# Patient Record
Sex: MALE | Race: OTHER | HISPANIC OR LATINO | URBAN - METROPOLITAN AREA
[De-identification: names, ages, dates, MRNs, and addresses within clinical notes are randomized per-mention and may not be internally consistent; named-entity substitution may affect disease eponyms.]

---

## 2017-02-18 ENCOUNTER — EMERGENCY (EMERGENCY)
Facility: HOSPITAL | Age: 22
LOS: 1 days | Discharge: HOME | End: 2017-02-18
Attending: EMERGENCY MEDICINE
Payer: COMMERCIAL

## 2017-02-18 VITALS — HEIGHT: 69 IN | WEIGHT: 149.91 LBS

## 2017-02-18 VITALS
RESPIRATION RATE: 20 BRPM | TEMPERATURE: 98 F | DIASTOLIC BLOOD PRESSURE: 77 MMHG | OXYGEN SATURATION: 97 % | SYSTOLIC BLOOD PRESSURE: 135 MMHG

## 2017-02-18 DIAGNOSIS — R10.9 UNSPECIFIED ABDOMINAL PAIN: ICD-10-CM

## 2017-02-18 DIAGNOSIS — R30.0 DYSURIA: ICD-10-CM

## 2017-02-18 DIAGNOSIS — K64.9 UNSPECIFIED HEMORRHOIDS: ICD-10-CM

## 2017-02-18 LAB
ANION GAP SERPL CALC-SCNC: 15 MMOL/L — SIGNIFICANT CHANGE UP (ref 5–17)
APPEARANCE UR: CLEAR — SIGNIFICANT CHANGE UP
BILIRUB UR-MCNC: NEGATIVE — SIGNIFICANT CHANGE UP
BUN SERPL-MCNC: 12 MG/DL — SIGNIFICANT CHANGE UP (ref 8–20)
CALCIUM SERPL-MCNC: 9.5 MG/DL — SIGNIFICANT CHANGE UP (ref 8.6–10.2)
CHLORIDE SERPL-SCNC: 101 MMOL/L — SIGNIFICANT CHANGE UP (ref 98–107)
CO2 SERPL-SCNC: 24 MMOL/L — SIGNIFICANT CHANGE UP (ref 22–29)
COLOR SPEC: YELLOW — SIGNIFICANT CHANGE UP
CREAT SERPL-MCNC: 0.84 MG/DL — SIGNIFICANT CHANGE UP (ref 0.5–1.3)
DIFF PNL FLD: NEGATIVE — SIGNIFICANT CHANGE UP
GLUCOSE SERPL-MCNC: 97 MG/DL — SIGNIFICANT CHANGE UP (ref 70–115)
GLUCOSE UR QL: NEGATIVE MG/DL — SIGNIFICANT CHANGE UP
HCT VFR BLD CALC: 44.5 % — SIGNIFICANT CHANGE UP (ref 42–52)
HGB BLD-MCNC: 15.9 G/DL — SIGNIFICANT CHANGE UP (ref 14–18)
KETONES UR-MCNC: NEGATIVE — SIGNIFICANT CHANGE UP
LEUKOCYTE ESTERASE UR-ACNC: NEGATIVE — SIGNIFICANT CHANGE UP
MCHC RBC-ENTMCNC: 29.2 PG — SIGNIFICANT CHANGE UP (ref 27–31)
MCHC RBC-ENTMCNC: 35.7 G/DL — SIGNIFICANT CHANGE UP (ref 32–36)
MCV RBC AUTO: 81.8 FL — SIGNIFICANT CHANGE UP (ref 80–94)
NITRITE UR-MCNC: NEGATIVE — SIGNIFICANT CHANGE UP
PH UR: 6 — SIGNIFICANT CHANGE UP (ref 4.8–8)
PLATELET # BLD AUTO: 224 K/UL — SIGNIFICANT CHANGE UP (ref 150–400)
POTASSIUM SERPL-MCNC: 4 MMOL/L — SIGNIFICANT CHANGE UP (ref 3.5–5.3)
POTASSIUM SERPL-SCNC: 4 MMOL/L — SIGNIFICANT CHANGE UP (ref 3.5–5.3)
PROT UR-MCNC: NEGATIVE MG/DL — SIGNIFICANT CHANGE UP
RBC # BLD: 5.44 M/UL — SIGNIFICANT CHANGE UP (ref 4.6–6.2)
RBC # FLD: 12.9 % — SIGNIFICANT CHANGE UP (ref 11–15.6)
SODIUM SERPL-SCNC: 140 MMOL/L — SIGNIFICANT CHANGE UP (ref 135–145)
SP GR SPEC: 1.01 — SIGNIFICANT CHANGE UP (ref 1.01–1.02)
UROBILINOGEN FLD QL: NEGATIVE MG/DL — SIGNIFICANT CHANGE UP
WBC # BLD: 9.4 K/UL — SIGNIFICANT CHANGE UP (ref 4.8–10.8)
WBC # FLD AUTO: 9.4 K/UL — SIGNIFICANT CHANGE UP (ref 4.8–10.8)
WBC UR QL: SIGNIFICANT CHANGE UP

## 2017-02-18 PROCEDURE — 36415 COLL VENOUS BLD VENIPUNCTURE: CPT

## 2017-02-18 PROCEDURE — 99284 EMERGENCY DEPT VISIT MOD MDM: CPT

## 2017-02-18 PROCEDURE — 80048 BASIC METABOLIC PNL TOTAL CA: CPT

## 2017-02-18 PROCEDURE — 81001 URINALYSIS AUTO W/SCOPE: CPT

## 2017-02-18 PROCEDURE — 85027 COMPLETE CBC AUTOMATED: CPT

## 2017-02-18 PROCEDURE — 99283 EMERGENCY DEPT VISIT LOW MDM: CPT

## 2017-02-18 PROCEDURE — T1013: CPT

## 2017-02-18 RX ORDER — FAMOTIDINE 10 MG/ML
20 INJECTION INTRAVENOUS DAILY
Qty: 0 | Refills: 0 | Status: DISCONTINUED | OUTPATIENT
Start: 2017-02-18 | End: 2017-02-22

## 2017-02-18 RX ADMIN — FAMOTIDINE 20 MILLIGRAM(S): 10 INJECTION INTRAVENOUS at 18:28

## 2017-02-18 NOTE — ED STATDOCS - ATTENDING CONTRIBUTION TO CARE
I, Akua Wood, performed the initial face to face bedside interview with this patient regarding history of present illness, review of symptoms and relevant past medical, social and family history.  I completed an independent physical examination.  I was the initial provider who evaluated this patient. I have signed out the follow up of any pending tests (i.e. labs, radiological studies) to the ACP.  I have communicated the patient’s plan of care and disposition with the ACP.  The history, relevant review of systems, past medical and surgical history, medical decision making, and physical examination was documented by the scribe in my presence and I attest to the accuracy of the documentation.

## 2017-02-18 NOTE — ED STATDOCS - OBJECTIVE STATEMENT
22 year old male presenting to the ED complaining of abdominal pain, dysuria, and rectal pain x 4 months. Pt states that he has pain in his rectum due to hemorrhoids. He also states that he feels "heat" when urinating. He denies having any penile discharge, nausea, vomiting, diarrhea, or fever. Pt states that he has not had unprotected sex recently. Pt states that he uses medication for hemorrhoids that do not provide relief.  PMD: Dr. Foley

## 2017-02-18 NOTE — ED STATDOCS - PROGRESS NOTE DETAILS
NP NOTE:  21 y/o M with intermittent suprapubic pain while urinating.  He denies any penile discharge.  He also has hemorrhoids that are causing him pain.  Has a "cream" that doesn't help.   HPI, ROS, and PE of intake doctor reviewed.   ROS: no fever or chills, no visual disturbances, no ear pain or decreased hearing, sore throat or dysphagia, no CP, edema, no SOB, wheezing or cough, + abdominal pain, no nausea, vomiting, diarrhea or constipation, + dysuria no hematuria, no back or neck pain, no HA, dizziness, or weakness, no rash, pruritis.  PE: Well developed well appearing, in NAD, PERRL, lungs CTA/BL, S1S2 RR no murmur, no edema, abd + bs x 4 soft, suprapubic tenderness to palpation, SUDHIR, no CVA tenderness, A&O x 3, CN II-Xii GI, MAEx 4,  5/5/ motors, = sensation, skin warm, dry and intact, no rash. NP NOTE:  21 y/o M with intermittent suprapubic pain while urinating.  He denies any penile discharge.  He also has hemorrhoids that are causing him pain.  Has a "cream" that doesn't help.   HPI, ROS, and PE of intake doctor reviewed.   ROS: no fever or chills, no visual disturbances, no ear pain or decreased hearing, sore throat or dysphagia, no CP, edema, no SOB, wheezing or cough, + abdominal pain, no nausea, vomiting, diarrhea or constipation, + dysuria no hematuria, no back or neck pain, no HA, dizziness, or weakness, no rash, pruritis.  PE: Well developed well appearing, in NAD, PERRL, lungs CTA/BL, S1S2 RR no murmur, no edema, abd + bs x 4 soft, suprapubic tenderness to palpation, uncircumcised, no discharge or lesions noted,  SUDHIR, no CVA tenderness, A&O x 3, CN II-Xii GI, MAEx 4,  5/5/ motors, = sensation, skin warm, dry and intact, no rash.  Labs and urine normal.  Will d/c home with referral to GI for possible internal hemorrhoids. And  for continued dysuria.

## 2017-02-18 NOTE — ED ADULT TRIAGE NOTE - CHIEF COMPLAINT QUOTE
"I have lower abdominal pain since Monday that is worsening. I also have pain to my rectum from hemorrhoids. I also have pain with urinating"

## 2017-02-18 NOTE — ED STATDOCS - NS ED MD SCRIBE ATTENDING SCRIBE SECTIONS
HIV/DISPOSITION/REVIEW OF SYSTEMS/HISTORY OF PRESENT ILLNESS/PHYSICAL EXAM/PAST MEDICAL/SURGICAL/SOCIAL HISTORY/VITAL SIGNS( Pullset)/INTAKE ASSESSMENT/SCREENINGS

## 2022-11-08 ENCOUNTER — EMERGENCY (EMERGENCY)
Facility: HOSPITAL | Age: 27
LOS: 1 days | Discharge: HOME | End: 2022-11-08
Attending: EMERGENCY MEDICINE
Payer: COMMERCIAL

## 2022-11-08 VITALS
RESPIRATION RATE: 19 BRPM | HEART RATE: 66 BPM | OXYGEN SATURATION: 99 % | TEMPERATURE: 98 F | HEIGHT: 69 IN | DIASTOLIC BLOOD PRESSURE: 85 MMHG | SYSTOLIC BLOOD PRESSURE: 137 MMHG | WEIGHT: 199.96 LBS

## 2022-11-08 PROCEDURE — 99283 EMERGENCY DEPT VISIT LOW MDM: CPT

## 2022-11-08 RX ORDER — FLUORESCEIN SODIUM 9 MG
1 STRIP OPHTHALMIC (EYE) ONCE
Refills: 0 | Status: COMPLETED | OUTPATIENT
Start: 2022-11-08 | End: 2022-11-08

## 2022-11-08 RX ORDER — OFLOXACIN 0.3 %
2 DROPS OPHTHALMIC (EYE)
Qty: 20 | Refills: 0
Start: 2022-11-08 | End: 2022-11-12

## 2022-11-08 RX ADMIN — Medication 1 APPLICATION(S): at 15:51

## 2022-11-08 RX ADMIN — Medication 1 DROP(S): at 15:51

## 2022-11-08 NOTE — ED PROVIDER NOTE - PATIENT PORTAL LINK FT
You can access the FollowMyHealth Patient Portal offered by [facility] by registering at the following website: [email or web address]. By joining [employer]’s FollowMyHealth portal, you will also be able to view your health information using other applications (apps) compatible with our system.

## 2022-11-08 NOTE — ED PROVIDER NOTE - CARE PROVIDER_API CALL
Ha Hinton)  Ophthalmology  [address], [address] [place] [address]  Phone: [phone]  Fax: [phone]  Follow Up Time:

## 2022-11-08 NOTE — ED ADULT TRIAGE NOTE - DOMESTIC TRAVEL HIGH RISK QUESTION
Called and left message for patient to call back office.      Can offer appointment on 4/12/21 @ 4:15pm.
New Patient  Pt c/b #[phone]  Pt stated  His PCP Ailyn Morales has referred him for Genital warts. Pt made aware that dr Jitendra Mcdaniel isn't accepting new patients right now. Pt insisted on asking dr Jitendra Mcdaniel for personal reasons. .  Please c/b to discuss
Patient returned call scheduled 4/12 @ 4:15
No

## 2022-11-08 NOTE — ED PROVIDER NOTE - NSFOLLOWUPINSTRUCTIONS_ED_ALL_ED_FT
Please use antibiotic eye drops as directed  Follow up with ophthalmology within 1- 2days  Return to ER as needed for new/worsening symptoms

## 2022-11-08 NOTE — CHART NOTE - NSCHARTNOTEFT_GEN_A_CORE
Patient's insurance non-par with [facility].  Appointment scheduled for tomorrow 11/9/22 at 10:30am with [facility]/Dr. Bass  Ophthalmology  [facility] [place] [address]  Phone: [phone]    Medical team informed. No further assistance needed.

## 2022-11-08 NOTE — ED PROVIDER NOTE - NS ED ATTENDING STATEMENT MOD
This was a shared visit with the SALEEM. I reviewed and verified the documentation and independently performed the documented:

## 2022-11-08 NOTE — ED PROVIDER NOTE - CLINICAL SUMMARY MEDICAL DECISION MAKING FREE TEXT BOX
pt with +FB in left eye unable to remove despite multiple attempts  will dc with abx eye drops and f/u ophtho -reached out to care coordinator for appt pt with +FB in left eye unable to remove despite multiple attempts  will dc with abx eye drops and f/u ophtho -reached out to care coordinator for appt - scheduled for tomorrow morning 11/9 at 10:30am with Dr. Bass at [facility]  pt given all info for appt and agrees to f.u in morning

## 2022-11-08 NOTE — ED PROVIDER NOTE - CARE PROVIDERS DIRECT ADDRESSES
[email or web address]

## 2022-11-08 NOTE — ED PROVIDER NOTE - PHYSICAL EXAMINATION
Gen: No acute distress, non toxic  HEENT: Mucous membranes moist, pink conjunctivae, PERRL, EOMI, small FB to lateral left eye mild conjunctival injection, no FB beneath eyelids, woodslamp with fluorescein stain +uptake in region of FB  CV: RRR, nl s1/s2.  Resp: CTAB, normal rate and effort  GI: Abdomen soft, NT, ND. No rebound, no guarding  : No CVAT  Neuro: A&O x 3, moving all 4 extremities  MSK: No spine or joint tenderness to palpation  Skin: No rashes. intact and perfused.

## 2022-11-08 NOTE — ED PROVIDER NOTE - OBJECTIVE STATEMENT
28 y/o M no pmhx c/o Left eye foreign body. pt was at work using a bandsaw felt small piece of metal go into eye. was not wearing any eye protection. reports FB sensation , denies pain or blurred vision. denies contact lens use 26 y/o M no pmhx c/o Left eye foreign body. pt was at work using a band saw felt small piece of metal go into eye. was not wearing any eye protection. reports FB sensation , denies pain or blurred vision. denies contact lens use

## 2022-11-09 ENCOUNTER — OFFICE (OUTPATIENT)
Dept: URBAN - METROPOLITAN AREA CLINIC 115 | Facility: CLINIC | Age: 27
Setting detail: OPHTHALMOLOGY
End: 2022-11-09
Payer: COMMERCIAL

## 2022-11-09 DIAGNOSIS — S05.02XA: ICD-10-CM

## 2022-11-09 DIAGNOSIS — D31.02: ICD-10-CM

## 2022-11-09 PROCEDURE — 92002 INTRM OPH EXAM NEW PATIENT: CPT | Performed by: OPTOMETRIST

## 2022-11-09 ASSESSMENT — REFRACTION_AUTOREFRACTION
OS_CYLINDER: -0.25
OD_SPHERE: +0.25
OD_AXIS: 000
OS_AXIS: 161
OS_SPHERE: +0.50
OD_CYLINDER: 0.00

## 2022-11-09 ASSESSMENT — CORNEAL TRAUMA - ABRASION: OS_ABRASION: PRESENT

## 2022-11-09 ASSESSMENT — TONOMETRY
OD_IOP_MMHG: 19
OS_IOP_MMHG: 18

## 2022-11-09 ASSESSMENT — CONFRONTATIONAL VISUAL FIELD TEST (CVF)
OD_FINDINGS: FULL
OS_FINDINGS: FULL

## 2022-11-09 ASSESSMENT — VISUAL ACUITY
OD_BCVA: 20/20
OS_BCVA: 20/20

## 2022-11-09 ASSESSMENT — SPHEQUIV_DERIVED
OS_SPHEQUIV: 0.375
OD_SPHEQUIV: 0.25

## 2023-05-16 NOTE — ED STATDOCS - CPE ED RESP NORM
Continue Regimen: Mometasone 0.1% cream bid, Clobetasol 0.05% solution Render In Strict Bullet Format?: No Detail Level: Zone normal...